# Patient Record
(demographics unavailable — no encounter records)

---

## 2024-10-21 NOTE — HISTORY OF PRESENT ILLNESS
[de-identified] : attention concerns [FreeTextEntry6] : 4 year old born at 25 weeks, c/s due to breech presentation and  labor, discharged from NICU 19, never intubated, +CPAP, NEC treated medically, PFO, ROP and minor anorectal malformation. No longer receives Special Education had been identified with Global developmental delays now mostly resolved No longer receives Speech and PT Early Intervention since infancy In  now school personnel have concerns about hyperactivity and disrupting class Academically doing well mom asking for school re evaluation  Currently has a cough on ICS and PRN Albuterol plays soccer

## 2024-10-21 NOTE — DISCUSSION/SUMMARY
[FreeTextEntry1] : Discussed in detail approach to diagnosis. Comprehensive Randall form for Parent and Teacher. Tips and handout provided for home/school plans. Simplify home environment keeping all simple and direct.  School to provide additional services and 504 plan if meets criteria for diagnosis. Agree with need to have school re - evaluation school may also provide counseling To consider evaluation for Learning Disability if cognitive impairment is found. Dietary advice including MVI and Fish oil discussed A good natural diet with less sugars and added ingredients reviewed.  To consider cognitive behavior therapy for the acquisition of coping skills for ADD. names provided  Role of medications not  discussed today Child is  doing well academically Reading is reported above average Math and written expression are average following directions and disrupting class as well as assignment completion are issues.  Much of this may be maturational as child is only 5 + 3 mos However, due to PMH and 25 week prematurity child is at risk.  TEACHER VALERIA SCORES: 6/9 INATTENTION 8/9 HYPERACTIVITY  PARENT VALERIA: 4/9 INATTENTION 8/9 HYPERACTIVITY  ASTHMA switch to ICS + spacer Flovent Rx continue Albuterol 2 INH QID PRN  RTO for well visit + vaccines  . Pros and cons addressed. need for cardiology clearance criteria also addressed.  The role of Martial Arts entertained.  We will reconvene when Valeria forms are completed and scored.

## 2024-10-21 NOTE — PHYSICAL EXAM
[NL] : warm, clear [Murmur] : murmur [FreeTextEntry1] : cooperative and not fidgeting [FreeTextEntry8] : 2/6 systolic m

## 2024-11-18 NOTE — DISCUSSION/SUMMARY
[Normal Growth] : growth [Normal Development] : development  [No Elimination Concerns] : elimination [Continue Regimen] : feeding [No Skin Concerns] : skin [Normal Sleep Pattern] : sleep [None] : no medical problems [School Readiness] : school readiness [Mental Health] : mental health [Nutrition and Physical Activity] : nutrition and physical activity [Oral Health] : oral health [Safety] : safety [Anticipatory Guidance Given] : Anticipatory guidance addressed as per the history of present illness section [No Medications] : ~He/She~ is not on any medications [FreeTextEntry1] : QUADRACEL AND FLU GIVEN TODAY Provided counseling on the diseases to be vaccinated against as well as the risks/benefits of providing and withholding recommended vaccines to be given today to ALLY .All questions were answered and the parent verbalized understanding.  SWYC REVIEWED AND DISCUSSED  TB risk assessment completed- no risk for TB. PPD not required  RX LAB CBC  HEARING NWL  VISION WNL  Discussed safety/feeding/sleep as appropriate for age.  Time allowed for questions and all answered with understanding..erpeds  F/U W ORTHO READ REPORT - 0 RESIENER NO SCOLIOSIS ON FILM PROCEED W MRI

## 2024-11-18 NOTE — PHYSICAL EXAM

## 2024-11-18 NOTE — PHYSICAL EXAM

## 2024-11-18 NOTE — HISTORY OF PRESENT ILLNESS
[Mother] : mother [Normal] : Normal [Brushing teeth] : Brushing teeth [Yes] : Patient goes to dentist yearly [Vitamin] : Primary Fluoride Source: Vitamin [Playtime (60 min/d)] : Playtime 60 min a day [In ] : In  [No] : Not at  exposure [Water heater temperature set at <120 degrees F] : Water heater temperature set at <120 degrees F [Car seat in back seat] : Car seat in back seat [Carbon Monoxide Detectors] : Carbon monoxide detectors [Supervised outdoor play] : Supervised outdoor play [Up to date] : Up to date [NO] : No [Smoke Detectors] : No smoke detectors [Exposure to electronic nicotine delivery system] : No exposure to electronic nicotine delivery system [FreeTextEntry7] : 6 YO WELL EXAM [de-identified] : SEE BELOW [de-identified] : good diet [de-identified] : 518 [FreeTextEntry1] : UNDER THE CARE OF GASTRO- Dr Eubanks  Constipation - mirilax PRN , no current issues UNDER THE CARE OF ORTHO - Dr Lagunas- back pain, MRI still to do UNDER THE CARE OF CARDIO - Dr Moore -  Yearly f/u stable   IN K ADD no meds, in process of getting CBT/ Therapy  ASTHMA- WELL CONTROLLED USES INHALERS IN WINTER/ COLD/ URI

## 2024-11-18 NOTE — DEVELOPMENTAL MILESTONES
[Normal Development] : Normal Development [Dresses and undresses without help] : dresses and undresses without help [Goes to the bathroom independently] : goes to bathroom independently [Tells a story of 2 sentences or more] : tells a story of 2 sentences or more [Follows directions for 4 individual] : follows directions for 4 individual prepositions [Counts 5 objects] : counts 5 objects [Is beginning to skip] : is beginning to skip [Walks on tiptoes when asked] : walks on tiptoes when asked [Copies a triangle] : copies a triangle [Draws a 6-part person] : draws a 6-part person [Copies first name] : copies first name [Cuts well with scissors] : cuts well with scissors

## 2024-11-18 NOTE — HISTORY OF PRESENT ILLNESS
[Mother] : mother [Normal] : Normal [Brushing teeth] : Brushing teeth [Yes] : Patient goes to dentist yearly [Vitamin] : Primary Fluoride Source: Vitamin [Playtime (60 min/d)] : Playtime 60 min a day [In ] : In  [No] : Not at  exposure [Water heater temperature set at <120 degrees F] : Water heater temperature set at <120 degrees F [Car seat in back seat] : Car seat in back seat [Carbon Monoxide Detectors] : Carbon monoxide detectors [Supervised outdoor play] : Supervised outdoor play [Up to date] : Up to date [NO] : No [Smoke Detectors] : No smoke detectors [Exposure to electronic nicotine delivery system] : No exposure to electronic nicotine delivery system [FreeTextEntry7] : 6 YO WELL EXAM [de-identified] : good diet [de-identified] : SEE BELOW [de-identified] : 514 [FreeTextEntry1] : UNDER THE CARE OF GASTRO- Dr Eubanks  Constipation - mirilax PRN , no current issues UNDER THE CARE OF ORTHO - Dr Lagunas- back pain, MRI still to do UNDER THE CARE OF CARDIO - Dr Moore -  Yearly f/u stable   IN K ADD no meds, in process of getting CBT/ Therapy  ASTHMA- WELL CONTROLLED USES INHALERS IN WINTER/ COLD/ URI

## 2024-12-03 NOTE — REVIEW OF SYSTEMS
[Fever] : no fever [Nasal Discharge] : nasal discharge [Nasal Congestion] : nasal congestion [Tachypnea] : tachypneic [Wheezing] : wheezing [Cough] : cough [Congestion] : congestion

## 2024-12-03 NOTE — DISCUSSION/SUMMARY
[FreeTextEntry1] : Probable viral pneumonia with asthma now exacerbated add Zmax for Mycoplasma coverage given community incidence continue Amoxil increase steroid to 7.5 ML  (22.5 mg) PO QD total 5 days Albuterol  Q 4 h PRN Budesonide BID RTO 1 week child is stable and clinically improved

## 2024-12-03 NOTE — PHYSICAL EXAM
[Acute Distress] : no acute distress [Clear] : right tympanic membrane clear [Clear Rhinorrhea] : clear rhinorrhea [Wheezing] : no wheezing [Rales] : rales [Transmitted Upper Airway Sounds] : transmitted upper airway sounds [Tachypnea] : no tachypnea [NL] : warm, clear [FreeTextEntry1] : RR 20 occ Bronchospastic dry repetitive cough [FreeTextEntry7] : mild occasional in back RLL

## 2024-12-03 NOTE — HISTORY OF PRESENT ILLNESS
[de-identified] : cough [FreeTextEntry6] : 5 year old seen at UC and dx with RLL pneumonia and asthma + CXR  s/p fevers on Amoxil on PO prednisolone  on Albuterol and Budesonide NEBs diagnosed Dec 1 here in follow up and concerns re cough/wheezing

## 2024-12-11 NOTE — DISCUSSION/SUMMARY
[FreeTextEntry1] : Resolved pneumonia Asthma in control  do  Fluticasone 2 INH BID all the time OR with all URI's before even cough Albuterol Q 4-6 H prn cough only RTO PRN advised on signs and symptoms requiring re evaluation and concern.

## 2024-12-11 NOTE — HISTORY OF PRESENT ILLNESS
[de-identified] : follow up  [FreeTextEntry6] : 5 year old with Asthma and recent RLL pneumonia s/p Amoxil + Zmax and po steroids here in follow up  doing well

## 2025-01-06 NOTE — PHYSICAL EXAM
[FreeTextEntry1] : Alert, comfortable, somewhat petite, in no apparent distress 5-1/2-year-old girl who allows to be examined.  She walks independently and does so with an out-toeing gait.  Bilateral flexible valgus ankles.  Both longitudinal arch shoes are preserved when she stands even without being on her toes.  Both ankles realign when she stands on her toes.  No clinical leg length discrepancies.  Bilateral physiologic genu valgum.  Full flexion and extension of the hips, abduction with hips in flexion 60 to 65 degrees bilaterally.  Internal rotation of the hips 60 degrees bilaterally, external rotation 65 degrees bilaterally.  Thigh foot angles approximately +15 degrees bilaterally.  Spine is grossly in the midline.  Normal exam of both upper extremities.

## 2025-01-06 NOTE — REASON FOR VISIT
[Follow Up] : a follow up visit [Patient] : patient [Mother] : mother [FreeTextEntry1] : LBP, hips assessment

## 2025-01-06 NOTE — DATA REVIEWED
[de-identified] : AP and frog lateral x-rays of the hips taken today show both hips within normal limits.

## 2025-01-06 NOTE — HISTORY OF PRESENT ILLNESS
[FreeTextEntry1] : Marva returns. 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  She was recommended physical therapy which she has been doing with improvement.  She is here today with her mother after the therapist is concerned with her "hips are too forward".  She has been doing well otherwise.  They denies any significant medical changes compared to the previous visit.  She uses bilateral SMOs.

## 2025-01-06 NOTE — ASSESSMENT
[FreeTextEntry1] : Diagnosis: VACTERL syndrome, out-toeing gait, bilateral flexible valgus ankles.  The history was obtained today from the child and parent; given the patient's age and/or the child's mental capacity, the history was unreliable and the parent was used as an independent historian..hadley Durham is a 5-1/2-year-old young girl with the above diagnosis.  She is doing well from orthopedic standpoint.  Mother is reassured that her hips are doing fine and there is no reason for concern.  I also had a long conversation regarding the use of SMOs in bilateral flexible valgus ankles.  Her mother is explained that these feet are considered a normal variant based on the patient's body constitution. They tend to be completely asymptomatic and cause no functional limitations. Shoe inserts, bracing etc. are not recommended since they do not change the natural history of this feet. The only recommendation for orthotics would be if they become painful, which is unlikely.  Mother is also reassured that using sandals, flip-flops etc. is not a problem and, furthermore, walking barefoot on uneven surfaces such as the grass and sand is recommended in order to strengthen the muscles of these patients around their ankles and feet. All her questions were addressed.  Orthopedic follow-up on a as needed basis.  All of the mother's questions were addressed. She understood and agreed with the plan.

## 2025-03-28 NOTE — ASSESSMENT
OK for sleep referral    [Educated Patient & Family about Diagnosis] : educated the patient and family about the diagnosis [FreeTextEntry1] : 5-year-old born at 25 weeks, c/s due to breech presentation and  labor, discharged from NICU 19, never intubated, +CPAP, NEC treated medically, PFO, ROP and minor anorectal malformation followed by surgery here for follow up of NARESH and constipation. No longer with abdominal pain stooling regularly on Miralax.  Recommend: -Continue Miralax 1-3 tsp daily, titrate to effect, monitor stools -Follow up in 3-4 months with NP. -Call with questions, concerns, or clinical change

## 2025-03-28 NOTE — HISTORY OF PRESENT ILLNESS
[de-identified] : Marva is a 5-year-old born at 25 weeks, c/s due to breech presentation and  labor, discharged from NICU 19, never intubated, +CPAP, NEC treated medically, PFO, ROP and minor anorectal malformation followed by surgery followed by Dr Eubanks for NARESH and constipation here today for follow up.  Otherwise recently healthy, no hospitalizations. ~ 2 weeks ago, was complaining of daily abdominal pain which prompted appointment to be made. Has been taking Miralax 1 tsp daily, stooling regularly daily [place] 4. No mucus or blood. Blood previously seen prior to starting Miralax. Sometimes with fecal accidents. No regular fecal soiling. Over the weekend starting on Saturday with acute viral illness, vomiting and diarrhea which was resolved on . No abdominal pain, emesis, spitting, chest pain. No longer thickening fluids, no longer seeing SLP. Eats variety of foods and textures. No choking, coughing or gagging with eating. Good UOP  2020 MBSS/UGI - UGI generally unremarkable, mild NARESH, MBSS endorses EHM via dr davey level 1 nipple.

## 2025-03-28 NOTE — CONSULT LETTER
[Dear  ___] : Dear  [unfilled], [Consult Letter:] : I had the pleasure of evaluating your patient, [unfilled]. [Please see my note below.] : Please see my note below. [Consult Closing:] : Thank you very much for allowing me to participate in the care of this patient.  If you have any questions, please do not hesitate to contact me. [Sincerely,] : Sincerely, [FreeTextEntry3] : Sue Cunha, RN, MSN, CPNP Certified Pediatric Nurse Practitioner

## 2025-03-28 NOTE — END OF VISIT
[FreeTextEntry3] : discussion time with family and patient re: illness, and management plan post-visit completion of charting, consultation, and review on the day of the visit.  [Time Spent: ___ minutes] : I have spent [unfilled] minutes of time on the encounter which excludes teaching and separately reported services.

## 2025-03-28 NOTE — ASSESSMENT
[Educated Patient & Family about Diagnosis] : educated the patient and family about the diagnosis [FreeTextEntry1] : 5-year-old born at 25 weeks, c/s due to breech presentation and  labor, discharged from NICU 19, never intubated, +CPAP, NEC treated medically, PFO, ROP and minor anorectal malformation followed by surgery here for follow up of NARESH and constipation. No longer with abdominal pain stooling regularly on Miralax.  Recommend: -Continue Miralax 1-3 tsp daily, titrate to effect, monitor stools -Follow up in 3-4 months with NP. -Call with questions, concerns, or clinical change

## 2025-03-28 NOTE — PHYSICAL EXAM
[Well Developed] : well developed [Well Nourished] : well nourished [NAD] : in no acute distress [Alert and Active] : alert and active [CTAB] : lungs clear to auscultation bilaterally [Soft] : soft  [Normal Bowel Sounds] : normal bowel sounds [No HSM] : no hepatosplenomegaly appreciated [Rectal Exam Deferred] : rectal exam was deferred [Normal Tone] : normal tone [Eczema] : eczema [Interactive] : interactive [icteric] : anicteric [Respiratory Distress] : no respiratory distress  [Distended] : non distended [Tender] : non tender [Stool Palpable] : no stool palpable [Rash] : no rash [Jaundice] : no jaundice [Appropriate Behavior] : appropriate behavior

## 2025-03-28 NOTE — HISTORY OF PRESENT ILLNESS
[de-identified] : Marva is a 5-year-old born at 25 weeks, c/s due to breech presentation and  labor, discharged from NICU 19, never intubated, +CPAP, NEC treated medically, PFO, ROP and minor anorectal malformation followed by surgery followed by Dr Eubanks for NARESH and constipation here today for follow up.  Otherwise recently healthy, no hospitalizations. ~ 2 weeks ago, was complaining of daily abdominal pain which prompted appointment to be made. Has been taking Miralax 1 tsp daily, stooling regularly daily [place] 4. No mucus or blood. Blood previously seen prior to starting Miralax. Sometimes with fecal accidents. No regular fecal soiling. Over the weekend starting on Saturday with acute viral illness, vomiting and diarrhea which was resolved on . No abdominal pain, emesis, spitting, chest pain. No longer thickening fluids, no longer seeing SLP. Eats variety of foods and textures. No choking, coughing or gagging with eating. Good UOP  2020 MBSS/UGI - UGI generally unremarkable, mild NARESH, MBSS endorses EHM via dr davey level 1 nipple.

## 2025-07-26 NOTE — HISTORY OF PRESENT ILLNESS
[de-identified] : ear pain [FreeTextEntry6] : presents with new onset left otalgia  denies pain on tugging earlobe no otorrhea lots of pool time low grade fever

## 2025-07-26 NOTE — DISCUSSION/SUMMARY
[FreeTextEntry1] : External otitis is usually caused by swimming and water which harbors bacteria causing inflammation/infection of the canal. The drops should be used as directed and no swimming is allowed for 5-7 days. To try to avoid recurrence, try to get water out after swimming and instill 2 drops of a mixture of 1/2 alcohol and 1/2 white vinegar. Never use Q-tips in the ear as this may also cause otitis externa.

## 2025-07-26 NOTE — REVIEW OF SYSTEMS
[Fever] : fever [Ear Pain] : ear pain [Negative] : Genitourinary [Headache] : no headache [Eye Discharge] : no eye discharge [Eye Redness] : no eye redness [Nasal Discharge] : no nasal discharge [Nasal Congestion] : no nasal congestion [Sore Throat] : no sore throat [Cough] : no cough [Vomiting] : no vomiting [Diarrhea] : no diarrhea [Rash] : no rash

## 2025-07-26 NOTE — PHYSICAL EXAM
[Inflammation of canal] : inflammation of canal [Left] : (left) [NL] : warm, clear [Discharge in canal] : no discharge in canal [Pain with manipulation of pinna] : no pain with manipulation of pinna [Erythema] : no erythema [Bulging] : not bulging [Erythematous Oropharynx] : nonerythematous oropharynx [Enlarged Tonsils] : tonsils not enlarged